# Patient Record
Sex: FEMALE | Race: WHITE | NOT HISPANIC OR LATINO | URBAN - METROPOLITAN AREA
[De-identification: names, ages, dates, MRNs, and addresses within clinical notes are randomized per-mention and may not be internally consistent; named-entity substitution may affect disease eponyms.]

---

## 2022-10-05 ENCOUNTER — VIRTUAL VISIT (OUTPATIENT)
Dept: SURGERY | Facility: CLINIC | Age: 32
End: 2022-10-05
Payer: COMMERCIAL

## 2022-10-05 VITALS — BODY MASS INDEX: 22.5 KG/M2 | HEIGHT: 66 IN | WEIGHT: 140 LBS

## 2022-10-05 DIAGNOSIS — Z13.228 SCREENING FOR ENDOCRINE, NUTRITIONAL, METABOLIC AND IMMUNITY DISORDER: ICD-10-CM

## 2022-10-05 DIAGNOSIS — K91.2 POSTSURGICAL MALABSORPTION: ICD-10-CM

## 2022-10-05 DIAGNOSIS — Z13.21 SCREENING FOR ENDOCRINE, NUTRITIONAL, METABOLIC AND IMMUNITY DISORDER: ICD-10-CM

## 2022-10-05 DIAGNOSIS — K21.9 GERD WITHOUT ESOPHAGITIS: ICD-10-CM

## 2022-10-05 DIAGNOSIS — Z13.0 SCREENING FOR ENDOCRINE, NUTRITIONAL, METABOLIC AND IMMUNITY DISORDER: ICD-10-CM

## 2022-10-05 DIAGNOSIS — Z13.29 SCREENING FOR ENDOCRINE, NUTRITIONAL, METABOLIC AND IMMUNITY DISORDER: ICD-10-CM

## 2022-10-05 DIAGNOSIS — Z98.84 BARIATRIC SURGERY STATUS: Primary | ICD-10-CM

## 2022-10-05 PROCEDURE — 99204 OFFICE O/P NEW MOD 45 MIN: CPT | Mod: GT | Performed by: PHYSICIAN ASSISTANT

## 2022-10-05 RX ORDER — IBUPROFEN 800 MG/1
800 TABLET, FILM COATED ORAL
COMMUNITY
Start: 2022-02-09

## 2022-10-05 RX ORDER — AMOXICILLIN 250 MG
1 CAPSULE ORAL
COMMUNITY
Start: 2020-10-16

## 2022-10-05 RX ORDER — DEXLANSOPRAZOLE 60 MG/1
60 CAPSULE, DELAYED RELEASE ORAL
COMMUNITY
Start: 2022-05-24

## 2022-10-05 RX ORDER — SCOLOPAMINE TRANSDERMAL SYSTEM 1 MG/1
1 PATCH, EXTENDED RELEASE TRANSDERMAL
COMMUNITY
Start: 2022-08-15

## 2022-10-05 RX ORDER — POLYETHYLENE GLYCOL 3350 17 G/17G
17 POWDER, FOR SOLUTION ORAL
COMMUNITY
Start: 2022-05-13

## 2022-10-05 RX ORDER — FOLIC ACID 1 MG/1
1 TABLET ORAL
COMMUNITY
Start: 2020-10-16

## 2022-10-05 RX ORDER — ESCITALOPRAM OXALATE 10 MG/1
1 TABLET ORAL DAILY
COMMUNITY
Start: 2021-08-17

## 2022-10-05 RX ORDER — FAMOTIDINE 10 MG
10 TABLET ORAL 2 TIMES DAILY
COMMUNITY

## 2022-10-05 NOTE — PROGRESS NOTES
Meli is a 32 year old who is being evaluated via a billable video visit.      If the video visit is dropped, the invitation should be resent by: Text to cell phone:   Will anyone else be joining your video visit? No      Video-Visit Details    Type of service:  Video Visit    Video Start Time: 10:36    Video End Time: 11:08    53 minutes spent on the date of the encounter doing chart review, review of test results, patient visit and documentation     Originating Location (pt. Location): Home    Distant Location (provider location):  [facility]     Platform used for Video Visit: [employer]            VIRTUAL ESTABLISH CARE BARIATRIC  VISIT           October 5, 2022       HISTORY OF PRESENT ILLNESS: Pt presents today for a bariatric establish care appointment status post laparoscopic gastric sleeve. Had a Gastric Sleeve done June 5, 2019 at [facility] in [facility] by Dr Duque. She has had routine follow up with her bariatric clinic. Unfortunately she has had severe issues with GERD.  Currently takes Dexilant 60 mg - a PPI after having been on other PPI's without much relief. Dexilant helps for the first 30 minutes only. Also take carafate 4 times daily that also does not help much. Chews tums and [place] seltzer. Has mid epigastric pain and burning that rises up to the back of her throat. Coughs and vomits when she lays down in spite of sleeping with her head elevated at night. Also noting dental deterioration because of the reflux. Has consulted with a general surgeon who recommended a revision to Vikki-en-Y bypass as he was unable to do a fundoplication due to the gastric sleeve. Denies tobacco use but drinks 2-3 cups of coffee daily    From Care Everywhere:  Esophagram reviewed from 8/2/2022 noted small hiatal hernia  EGD done on 5/4/2022 at [facility] noted LA Grade B reflux esophagiits          BARIATRIC METRICS:  Current Weight: 140 lb (63.5  "kg)  Body mass index is 22.6 kg/m .            Patient is taking the following bariatric postoperative vitamins:  2 Complete multivitamins with minerals (at different times than calcium)   5000 International Units of Vitamin D daily  No Calcium   2500 mcg of Vitamin B12 sl every day  No iron      Pt is exercising by walking daily and does at home gym videos          SOCIAL HISTORY:  Pt denies smoking.  Pt denies alcohol use.  Took ibuprofen for about 4 days after her hysterectomy. Drinks 2-3 cups of coffee daily.        REVIEW OF SYSTEMS:     GI:  Nausea- More so when laying down.   Vomiting- 5/7 days will vomit at night when she lays down.  - mainly acid and stomach bile.   Diarrhea- No  Constipation- yes    Drinks about 160 oz water daily  Dysphagia- Only when she drinks too much at one time  Abdominal Pain- see above  Heartburn- see above     SKIN:  Intertriginous irritation- Gets rashes on pannus area. Cleans and keeps jennifer  Hair loss -  Not much anymore       PSYCH:  Depression- No  Anxiety- No      LABS/IMAGING/MEDICAL RECORDS REVIEW: [facility]  PHYSICAL EXAMINATION:   Ht 5' 6\" (1.676 m)   Wt 140 lb (63.5 kg)   BMI 22.60 kg/m    GENERAL: Healthy, alert and no distress  EYES: Eyes grossly normal to inspection.  No discharge or erythema, or obvious scleral/conjunctival abnormalities.  RESP: No audible wheeze, cough, or visible cyanosis.  No visible retractions or increased work of breathing.    SKIN: Visible skin clear. No significant rash, abnormal pigmentation or lesions.  NEURO: Cranial nerves grossly intact.  Mentation and speech appropriate for age.  PSYCH: Mentation appears normal, affect normal/bright, judgement and insight intact, normal speech and appearance well-groomed.            ASSESSMENT AND PLAN:    3 years status laparoscopic gastric sleeve   Body mass index is 22.6 kg/m ..  Post surgical malabsorption:   Ordered CBC, vitamin B12, vitamin D, PTH, ferritin, TIBC, and iron labs.   Follow food plan " per dietitian recommendations.   Continue taking recommended post-op vitamins.  GERD with esophagitis - Continue with current treatment. Will have patient upload her insurance policy for revision surgeries. After that can consult with Dr Abarca if she has coverage. Next steps will be determined after review  Emphasized the importance of avoiding all caffeine     Patient is asked to schedule with a dietitian for an initial establish care appointment.        Mana Ovalle MS, PA-C

## 2022-10-05 NOTE — PATIENT INSTRUCTIONS
"Nice to talk with you today. Thank you for allowing me the privilege of caring for you. We hope we provided you with the excellent service you deserve.     To ensure the quality of our services you may receive a patient satisfaction survey from an independent monitoring company.  The greatest compliment you can give is \"Likely to Recommend\"    Below is our plan we discussed.-  YOEL Adair      Labs have been ordered in the system.You can have these drawn at any [facility] lab.  Please call [phone] set up an appointment.  Your results will be posted on [employer] as soon as they're complete.  After all are resulted, I will review and comment to you.       FOLLOW-UP:  Please call [phone] to schedule an establish care visit with a dietitian. Also please provide clinic with a copy of your insurance policy for revision surgeries. This will determine next steps         Bariatric Post Op Guidelines  General:    To avoid marginal ulcers avoid all forms of tobacco, alcohol in excess, caffeine, and NSAIDS (unless indicated for cardioprotection or othewise and opposed by a PPI).    Exercise is key for continued weight loss and weight maintenance. Aim for 30-60 minutes of physical activity most days of the week. Include cardiovascular and strength training.    Continue lifelong vitamins supplementation and annual lab follow up.  All  patients should supplement with the following bariatric postoperative vitamins:  2 Complete multivitamins with minerals (at different times than calcium)  Vitamin D 5000 Int Units/125 mg daily   Calcium 600 mg twice daily or 500 mg three times daily   Vitamin B12: 500 mcg sl daily or 1000 mcg Inj monthly  B complex daily or Thiamine 100 mg weekly  1 Iron/Vit C. Daily for females who menstruate and/or as directed    The bariatric team should be aware and evaluate all adverse gastrointestinal symptoms which can be a sign of complication. Inability to tolerate textured solid food " (chicken, steak, fish) may need to be evaluated by endoscopy.    There is a 10% increase of Alcohol Use Disorder in patients with bariatric surgery.   Most often occurring around 2 years post op.  Please call the clinic if you feel alcohol is interfering in your daily life.  We can help.     Follow up annually lifelong. Obesity is a chronic disease.  Weight gain can be expected. The goal of follow-up visits is to ensure adequate vitamin and protein absorption, evaluate food intake behavior, review exercise/activity level, and assist with weight regain.    Nutritional:  Eat 3 meals per day  (No snacks between meals.)  Do not skip meals.  This can cause overeating at the next meal and will prevent adequate protein and nutritional intake.    Aim for 60-80 grams of protein per day.  Always eat your protein first. This assists with optimal nutrition and helps you stay full longer.    Eat your protein first, and then follow with fiber.    Add fiber by including fruits, vegetables, whole grains, and beans.     Portions should be about 1 cup per meal. Use measuring cups to be accurate.  Continue to use saucer/salad plates, infant/toddler silverware to keep portion sizes small and take small bites.    Eat [id number] to make each meal last 20-30 minutes. Always stop eating when satisfied.    Aim for 64 oz. of calorie-free fluids daily.    Avoid drinking 30 min before, during, and 30 min after meal    Avoid high sugar and high fat foods to prevent high calorie intake. This will reduce your rate of weight loss and can cause weight regain.   Check nutrition labels for less than 10 grams of sugar and less than 10 grams of fat per serving.

## 2023-02-12 ENCOUNTER — HEALTH MAINTENANCE LETTER (OUTPATIENT)
Age: 33
End: 2023-02-12

## 2024-03-10 ENCOUNTER — HEALTH MAINTENANCE LETTER (OUTPATIENT)
Age: 34
End: 2024-03-10

## 2025-03-16 ENCOUNTER — HEALTH MAINTENANCE LETTER (OUTPATIENT)
Age: 35
End: 2025-03-16